# Patient Record
Sex: MALE | Race: WHITE | NOT HISPANIC OR LATINO | RURAL
[De-identification: names, ages, dates, MRNs, and addresses within clinical notes are randomized per-mention and may not be internally consistent; named-entity substitution may affect disease eponyms.]

---

## 2018-08-23 ENCOUNTER — OFFICE VISIT (OUTPATIENT)
Dept: RETAIL CLINIC | Facility: CLINIC | Age: 53
End: 2018-08-23

## 2018-08-23 VITALS
OXYGEN SATURATION: 96 % | TEMPERATURE: 99 F | SYSTOLIC BLOOD PRESSURE: 134 MMHG | HEART RATE: 73 BPM | RESPIRATION RATE: 15 BRPM | WEIGHT: 114.4 LBS | BODY MASS INDEX: 17.34 KG/M2 | DIASTOLIC BLOOD PRESSURE: 82 MMHG | HEIGHT: 68 IN

## 2018-08-23 DIAGNOSIS — R68.89 FLU-LIKE SYMPTOMS: ICD-10-CM

## 2018-08-23 DIAGNOSIS — J40 BRONCHITIS: Primary | ICD-10-CM

## 2018-08-23 LAB
EXPIRATION DATE: NORMAL
FLUAV AG NPH QL: NEGATIVE
FLUBV AG NPH QL: NEGATIVE
INTERNAL CONTROL: NORMAL
Lab: NORMAL

## 2018-08-23 PROCEDURE — 99203 OFFICE O/P NEW LOW 30 MIN: CPT | Performed by: NURSE PRACTITIONER

## 2018-08-23 PROCEDURE — 87804 INFLUENZA ASSAY W/OPTIC: CPT | Performed by: NURSE PRACTITIONER

## 2018-08-23 RX ORDER — DEXTROMETHORPHAN HYDROBROMIDE AND PROMETHAZINE HYDROCHLORIDE 15; 6.25 MG/5ML; MG/5ML
5 SYRUP ORAL 4 TIMES DAILY PRN
Qty: 240 ML | Refills: 0 | Status: SHIPPED | OUTPATIENT
Start: 2018-08-23 | End: 2018-09-02

## 2018-08-23 RX ORDER — PREDNISONE 10 MG/1
TABLET ORAL DAILY
Qty: 21 EACH | Refills: 0 | Status: SHIPPED | OUTPATIENT
Start: 2018-08-23 | End: 2018-08-29

## 2018-08-23 RX ORDER — DOXYCYCLINE HYCLATE 100 MG/1
100 CAPSULE ORAL 2 TIMES DAILY
Qty: 20 CAPSULE | Refills: 0 | Status: SHIPPED | OUTPATIENT
Start: 2018-08-23 | End: 2018-09-02

## 2018-08-23 NOTE — PROGRESS NOTES
"Leif Dailey is a 53 y.o. male.     Flu Symptoms   This is a new problem. Episode onset: 2 days. The problem occurs intermittently. The problem has been waxing and waning. Associated symptoms include anorexia, arthralgias, chills, congestion, coughing, fatigue, a fever, headaches, myalgias, nausea, a sore throat and weakness. Pertinent negatives include no abdominal pain, chest pain, neck pain, swollen glands or vomiting. The symptoms are aggravated by coughing. He has tried acetaminophen (otc cold and flu medication) for the symptoms. The treatment provided no relief.        The following portions of the patient's history were reviewed and updated as appropriate: allergies, current medications, past medical history, past social history, past surgical history and problem list.    Review of Systems   Constitutional: Positive for appetite change, chills, fatigue and fever.   HENT: Positive for congestion, postnasal drip, rhinorrhea and sore throat.    Eyes: Negative.    Respiratory: Positive for cough and chest tightness. Negative for shortness of breath and wheezing.    Cardiovascular: Negative.  Negative for chest pain.   Gastrointestinal: Positive for anorexia and nausea. Negative for abdominal pain, diarrhea and vomiting.   Musculoskeletal: Positive for arthralgias and myalgias. Negative for neck pain.   Skin: Negative.    Neurological: Positive for weakness and headaches.   Hematological: Negative for adenopathy.        /82   Pulse 73   Temp 99 °F (37.2 °C) (Temporal Artery )   Resp 15   Ht 172.7 cm (68\")   Wt 51.9 kg (114 lb 6.4 oz)   SpO2 96%   BMI 17.39 kg/m²      Objective   Physical Exam   Constitutional: He is oriented to person, place, and time. Vital signs are normal. He appears well-developed and well-nourished.   HENT:   Head: Normocephalic.   Right Ear: External ear and ear canal normal. No drainage, swelling or tenderness. Tympanic membrane is not erythematous and not " bulging.   Left Ear: External ear and ear canal normal. No drainage, swelling or tenderness. Tympanic membrane is not erythematous and not bulging.   Nose: Mucosal edema and rhinorrhea present. Right sinus exhibits no maxillary sinus tenderness and no frontal sinus tenderness. Left sinus exhibits no maxillary sinus tenderness and no frontal sinus tenderness.   Mouth/Throat: Uvula is midline, oropharynx is clear and moist and mucous membranes are normal. Tonsils are 0 on the right. Tonsils are 0 on the left. No tonsillar exudate.   Eyes: Pupils are equal, round, and reactive to light. Conjunctivae and lids are normal. Lids are everted and swept, no foreign bodies found. Right eye exhibits no discharge. Left eye exhibits no discharge.   Cardiovascular: Normal rate, regular rhythm, S1 normal, S2 normal and normal heart sounds.    Pulmonary/Chest: Effort normal. No respiratory distress. He has no decreased breath sounds. He has wheezes in the right upper field, the right middle field, the left upper field and the left middle field. He has rhonchi in the right upper field, the right middle field, the left upper field and the left middle field. He has no rales.   Abdominal: Soft. Normal appearance and bowel sounds are normal. There is no tenderness. There is no rebound and no guarding.   Lymphadenopathy:        Head (right side): No tonsillar adenopathy present.        Head (left side): No tonsillar adenopathy present.     He has no cervical adenopathy.   Neurological: He is alert and oriented to person, place, and time.   Skin: Skin is warm, dry and intact. No rash noted. He is not diaphoretic.   Psychiatric: He has a normal mood and affect. His speech is normal and behavior is normal. Thought content normal.   Vitals reviewed.       Results for orders placed or performed in visit on 08/23/18   POC Influenza A / B   Result Value Ref Range    Rapid Influenza A Ag NEGATIVE     Rapid Influenza B Ag NEGATIVE     Internal  Control Passed Passed    Lot Number [id number]     Expiration Date 11,082,020         Assessment/Plan   Chet was seen today for flu symptoms.    Diagnoses and all orders for this visit:    Bronchitis  -     doxycycline (VIBRAMYCIN) 100 MG capsule; Take 1 capsule by mouth 2 (Two) Times a Day for 10 days.  -     promethazine-dextromethorphan (PROMETHAZINE-DM) 6.25-15 MG/5ML syrup; Take 5 mL by mouth 4 (Four) Times a Day As Needed for Cough for up to 10 days.  -     PredniSONE (DELTASONE) 10 MG (21) tablet pack; Take  by mouth Daily for 6 days. Use as directed on package    Flu-like symptoms  -     POC Influenza A / B  -     PredniSONE (DELTASONE) 10 MG (21) tablet pack; Take  by mouth Daily for 6 days. Use as directed on package

## 2018-08-23 NOTE — PATIENT INSTRUCTIONS
Chronic Bronchitis  Chronic bronchitis is a lasting inflammation of the bronchial tubes, which are the tubes that carry air into your lungs. This is inflammation that occurs:  · On most days of the week.  · For at least three months at a time.  · Over a period of two years in a row.    When the bronchial tubes are inflamed, they start to produce mucus. The inflammation and buildup of mucus make it more difficult to breathe. Chronic bronchitis is usually a permanent problem and is one type of chronic obstructive pulmonary disease (COPD). People with chronic bronchitis are at greater risk for getting repeated colds, or respiratory infections.  What are the causes?  Chronic bronchitis most often occurs in people who have:  · Long-standing, severe asthma.  · A history of smoking.  · Asthma and who also smoke.    What are the signs or symptoms?  Chronic bronchitis may cause the following:  · A cough that brings up mucus (productive cough).  · Shortness of breath.  · Early morning headache.  · Wheezing.  · Chest discomfort.  · Recurring respiratory infections.    How is this diagnosed?  Your health care provider may confirm the diagnosis by:  · Taking your medical history.  · Performing a physical exam.  · Taking a chest X-ray.  · Performing pulmonary function tests.    How is this treated?  Treatment involves controlling symptoms with medicines, oxygen therapy, or making lifestyle changes, such as exercising and eating a healthy, well-balanced diet. Medicines could include:  · Inhalers to improve air flow in and out of your lungs.  · Antibiotics to treat bacterial infections, such as pneumonia, sinus infections, and acute bronchitis.    As a preventative measure, your health care provider may recommend routine vaccinations for influenza and pneumonia. This is to prevent infection and hospitalization since you may be more at risk for these types of infections.  Follow these instructions at home:  · Take medicines only as  "directed by your health care provider.  · If you smoke cigarettes, chew tobacco, or use electronic cigarettes, quit. If you need help quitting, ask your health care provider.  · Avoid pollen, dust, animal dander, molds, smoke, and other things that cause shortness of breath or wheezing attacks.  · Talk to your health care provider about possible exercise routines. Regular exercise is very important to help you feel better.  · If you are prescribed oxygen use at home follow these guidelines:  ? Never smoke while using oxygen. Oxygen does not burn or explode, but flammable materials will burn faster in the presence of oxygen.  ? Keep a fire extinguisher close by. Let your fire department know that you have oxygen in your home.  ? Warn visitors not to smoke near you when you are using oxygen. Put up \"no smoking\" signs in your home where you most often use the oxygen.  ? Regularly test your smoke detectors at home to make sure they work. If you receive care in your home from a nurse or other health care provider, he or she may also check to make sure your smoke detectors work.  · Ask your health care provider whether you would benefit from a pulmonary rehabilitation program.  · Do not wait to get medical care if you have any concerning symptoms. Delays could cause permanent injury and may be life threatening.  Contact a health care provider if:  · You have increased coughing or shortness of breath or both.  · You have muscle aches.  · You have chest pain.  · Your mucus gets thicker.  · Your mucus changes from clear or white to yellow, green, gray, or bloody.  Get help right away if:  · Your usual medicines do not stop your wheezing.  · You have increased difficulty breathing.  · You have any problems with the medicine you are taking, such as a rash, itching, swelling, or trouble breathing.  This information is not intended to replace advice given to you by your health care provider. Make sure you discuss any questions " you have with your health care provider.  Document Released: 10/05/2007 Document Revised: 04/27/2017 Document Reviewed: 01/26/2015  [employer] Interactive Patient Education © 2018 [employer]